# Patient Record
Sex: FEMALE | Race: BLACK OR AFRICAN AMERICAN | NOT HISPANIC OR LATINO | Employment: FULL TIME | URBAN - METROPOLITAN AREA
[De-identification: names, ages, dates, MRNs, and addresses within clinical notes are randomized per-mention and may not be internally consistent; named-entity substitution may affect disease eponyms.]

---

## 2023-08-28 ENCOUNTER — OFFICE VISIT (OUTPATIENT)
Dept: PRIMARY CARE | Facility: CLINIC | Age: 33
End: 2023-08-28
Payer: COMMERCIAL

## 2023-08-28 ENCOUNTER — LAB (OUTPATIENT)
Dept: LAB | Facility: LAB | Age: 33
End: 2023-08-28
Payer: COMMERCIAL

## 2023-08-28 VITALS
SYSTOLIC BLOOD PRESSURE: 138 MMHG | HEIGHT: 59 IN | WEIGHT: 192 LBS | OXYGEN SATURATION: 98 % | DIASTOLIC BLOOD PRESSURE: 90 MMHG | HEART RATE: 69 BPM | BODY MASS INDEX: 38.71 KG/M2

## 2023-08-28 DIAGNOSIS — Z00.00 HEALTHCARE MAINTENANCE: Primary | ICD-10-CM

## 2023-08-28 DIAGNOSIS — R20.2 PARESTHESIAS: ICD-10-CM

## 2023-08-28 DIAGNOSIS — M79.89 LEG SWELLING: ICD-10-CM

## 2023-08-28 DIAGNOSIS — Z00.00 HEALTHCARE MAINTENANCE: ICD-10-CM

## 2023-08-28 PROBLEM — F41.9 ANXIETY: Status: ACTIVE | Noted: 2023-08-28

## 2023-08-28 PROBLEM — N91.5 OLIGOMENORRHEA: Status: RESOLVED | Noted: 2023-08-28 | Resolved: 2023-08-28

## 2023-08-28 PROBLEM — F32.A DEPRESSION: Status: ACTIVE | Noted: 2023-08-28

## 2023-08-28 PROBLEM — J01.90 ACUTE BACTERIAL SINUSITIS: Status: RESOLVED | Noted: 2023-08-28 | Resolved: 2023-08-28

## 2023-08-28 PROBLEM — N64.3 GALACTORRHEA: Status: ACTIVE | Noted: 2023-08-28

## 2023-08-28 PROBLEM — O21.9 NAUSEA AND VOMITING IN PREGNANCY (HHS-HCC): Status: RESOLVED | Noted: 2023-08-28 | Resolved: 2023-08-28

## 2023-08-28 PROBLEM — E78.5 HYPERLIPIDEMIA, ACQUIRED: Status: ACTIVE | Noted: 2023-08-28

## 2023-08-28 PROBLEM — O36.80X0 EXAMINATION TO DETERMINE FETAL VIABILITY OF PREGNANCY (HHS-HCC): Status: RESOLVED | Noted: 2023-08-28 | Resolved: 2023-08-28

## 2023-08-28 PROBLEM — N39.0 ACUTE UTI: Status: RESOLVED | Noted: 2023-08-28 | Resolved: 2023-08-28

## 2023-08-28 PROBLEM — E66.9 OBESITY: Status: RESOLVED | Noted: 2023-08-28 | Resolved: 2023-08-28

## 2023-08-28 PROBLEM — N80.B2: Status: ACTIVE | Noted: 2023-08-28

## 2023-08-28 PROBLEM — R05.9 COUGH: Status: RESOLVED | Noted: 2023-08-28 | Resolved: 2023-08-28

## 2023-08-28 PROBLEM — L30.9 DERMATITIS: Status: RESOLVED | Noted: 2023-08-28 | Resolved: 2023-08-28

## 2023-08-28 PROBLEM — I10 HYPERTENSION: Status: ACTIVE | Noted: 2023-08-28

## 2023-08-28 PROBLEM — J93.9 PNEUMOTHORAX: Status: ACTIVE | Noted: 2023-08-28

## 2023-08-28 PROBLEM — O09.90 HIGH-RISK PREGNANCY (HHS-HCC): Status: ACTIVE | Noted: 2023-08-28

## 2023-08-28 PROBLEM — N76.0 VAGINITIS: Status: RESOLVED | Noted: 2023-08-28 | Resolved: 2023-08-28

## 2023-08-28 PROBLEM — N97.9 FEMALE INFERTILITY: Status: ACTIVE | Noted: 2023-08-28

## 2023-08-28 PROBLEM — O14.90: Status: ACTIVE | Noted: 2023-08-28

## 2023-08-28 PROBLEM — R35.0 URINARY FREQUENCY: Status: RESOLVED | Noted: 2023-08-28 | Resolved: 2023-08-28

## 2023-08-28 PROBLEM — R63.5 WEIGHT GAIN: Status: RESOLVED | Noted: 2023-08-28 | Resolved: 2023-08-28

## 2023-08-28 PROBLEM — F41.1 GENERALIZED ANXIETY DISORDER: Status: ACTIVE | Noted: 2023-08-28

## 2023-08-28 PROBLEM — D35.2 PROLACTINOMA, BENIGN (MULTI): Status: ACTIVE | Noted: 2023-08-28

## 2023-08-28 PROBLEM — B96.89 ACUTE BACTERIAL SINUSITIS: Status: RESOLVED | Noted: 2023-08-28 | Resolved: 2023-08-28

## 2023-08-28 LAB
ALANINE AMINOTRANSFERASE (SGPT) (U/L) IN SER/PLAS: 19 U/L (ref 7–45)
ALBUMIN (G/DL) IN SER/PLAS: 4.1 G/DL (ref 3.4–5)
ALKALINE PHOSPHATASE (U/L) IN SER/PLAS: 38 U/L (ref 33–110)
ANION GAP IN SER/PLAS: 14 MMOL/L (ref 10–20)
ASPARTATE AMINOTRANSFERASE (SGOT) (U/L) IN SER/PLAS: 17 U/L (ref 9–39)
BASOPHILS (10*3/UL) IN BLOOD BY AUTOMATED COUNT: 0.04 X10E9/L (ref 0–0.1)
BASOPHILS/100 LEUKOCYTES IN BLOOD BY AUTOMATED COUNT: 0.7 % (ref 0–2)
BILIRUBIN TOTAL (MG/DL) IN SER/PLAS: 0.5 MG/DL (ref 0–1.2)
CALCIUM (MG/DL) IN SER/PLAS: 8.8 MG/DL (ref 8.6–10.6)
CARBON DIOXIDE, TOTAL (MMOL/L) IN SER/PLAS: 22 MMOL/L (ref 21–32)
CHLORIDE (MMOL/L) IN SER/PLAS: 109 MMOL/L (ref 98–107)
CHOLESTEROL (MG/DL) IN SER/PLAS: 159 MG/DL (ref 0–199)
CHOLESTEROL IN HDL (MG/DL) IN SER/PLAS: 38.9 MG/DL
CHOLESTEROL/HDL RATIO: 4.1
COBALAMIN (VITAMIN B12) (PG/ML) IN SER/PLAS: 254 PG/ML (ref 211–911)
CREATININE (MG/DL) IN SER/PLAS: 0.84 MG/DL (ref 0.5–1.05)
EOSINOPHILS (10*3/UL) IN BLOOD BY AUTOMATED COUNT: 0.1 X10E9/L (ref 0–0.7)
EOSINOPHILS/100 LEUKOCYTES IN BLOOD BY AUTOMATED COUNT: 1.7 % (ref 0–6)
ERYTHROCYTE DISTRIBUTION WIDTH (RATIO) BY AUTOMATED COUNT: 13.2 % (ref 11.5–14.5)
ERYTHROCYTE MEAN CORPUSCULAR HEMOGLOBIN CONCENTRATION (G/DL) BY AUTOMATED: 31.7 G/DL (ref 32–36)
ERYTHROCYTE MEAN CORPUSCULAR VOLUME (FL) BY AUTOMATED COUNT: 93 FL (ref 80–100)
ERYTHROCYTES (10*6/UL) IN BLOOD BY AUTOMATED COUNT: 4.06 X10E12/L (ref 4–5.2)
ESTIMATED AVERAGE GLUCOSE FOR HBA1C: 128 MG/DL
FOLATE (NG/ML) IN SER/PLAS: 8.7 NG/ML
GFR FEMALE: >90 ML/MIN/1.73M2
GLUCOSE (MG/DL) IN SER/PLAS: 76 MG/DL (ref 74–99)
HEMATOCRIT (%) IN BLOOD BY AUTOMATED COUNT: 37.8 % (ref 36–46)
HEMOGLOBIN (G/DL) IN BLOOD: 12 G/DL (ref 12–16)
HEMOGLOBIN A1C/HEMOGLOBIN TOTAL IN BLOOD: 6.1 %
IMMATURE GRANULOCYTES/100 LEUKOCYTES IN BLOOD BY AUTOMATED COUNT: 0 % (ref 0–0.9)
LDL: 111 MG/DL (ref 0–99)
LEUKOCYTES (10*3/UL) IN BLOOD BY AUTOMATED COUNT: 6 X10E9/L (ref 4.4–11.3)
LYMPHOCYTES (10*3/UL) IN BLOOD BY AUTOMATED COUNT: 3.41 X10E9/L (ref 1.2–4.8)
LYMPHOCYTES/100 LEUKOCYTES IN BLOOD BY AUTOMATED COUNT: 57.3 % (ref 13–44)
MONOCYTES (10*3/UL) IN BLOOD BY AUTOMATED COUNT: 0.48 X10E9/L (ref 0.1–1)
MONOCYTES/100 LEUKOCYTES IN BLOOD BY AUTOMATED COUNT: 8.1 % (ref 2–10)
NEUTROPHILS (10*3/UL) IN BLOOD BY AUTOMATED COUNT: 1.92 X10E9/L (ref 1.2–7.7)
NEUTROPHILS/100 LEUKOCYTES IN BLOOD BY AUTOMATED COUNT: 32.2 % (ref 40–80)
NRBC (PER 100 WBCS) BY AUTOMATED COUNT: 0 /100 WBC (ref 0–0)
PLATELETS (10*3/UL) IN BLOOD AUTOMATED COUNT: 329 X10E9/L (ref 150–450)
POTASSIUM (MMOL/L) IN SER/PLAS: 4 MMOL/L (ref 3.5–5.3)
PROTEIN TOTAL: 6.3 G/DL (ref 6.4–8.2)
SODIUM (MMOL/L) IN SER/PLAS: 141 MMOL/L (ref 136–145)
THYROTROPIN (MIU/L) IN SER/PLAS BY DETECTION LIMIT <= 0.05 MIU/L: 1.44 MIU/L (ref 0.44–3.98)
THYROXINE (T4) FREE (NG/DL) IN SER/PLAS: 1.02 NG/DL (ref 0.78–1.48)
TRIGLYCERIDE (MG/DL) IN SER/PLAS: 46 MG/DL (ref 0–149)
UREA NITROGEN (MG/DL) IN SER/PLAS: 11 MG/DL (ref 6–23)
VLDL: 9 MG/DL (ref 0–40)

## 2023-08-28 PROCEDURE — 1036F TOBACCO NON-USER: CPT | Performed by: INTERNAL MEDICINE

## 2023-08-28 PROCEDURE — 36415 COLL VENOUS BLD VENIPUNCTURE: CPT

## 2023-08-28 PROCEDURE — 82607 VITAMIN B-12: CPT

## 2023-08-28 PROCEDURE — 3075F SYST BP GE 130 - 139MM HG: CPT | Performed by: INTERNAL MEDICINE

## 2023-08-28 PROCEDURE — 80053 COMPREHEN METABOLIC PANEL: CPT

## 2023-08-28 PROCEDURE — 82746 ASSAY OF FOLIC ACID SERUM: CPT

## 2023-08-28 PROCEDURE — 3080F DIAST BP >= 90 MM HG: CPT | Performed by: INTERNAL MEDICINE

## 2023-08-28 PROCEDURE — 99214 OFFICE O/P EST MOD 30 MIN: CPT | Performed by: INTERNAL MEDICINE

## 2023-08-28 PROCEDURE — 80061 LIPID PANEL: CPT

## 2023-08-28 PROCEDURE — 84425 ASSAY OF VITAMIN B-1: CPT

## 2023-08-28 PROCEDURE — 84439 ASSAY OF FREE THYROXINE: CPT

## 2023-08-28 PROCEDURE — 84443 ASSAY THYROID STIM HORMONE: CPT

## 2023-08-28 PROCEDURE — 83036 HEMOGLOBIN GLYCOSYLATED A1C: CPT

## 2023-08-28 PROCEDURE — 85025 COMPLETE CBC W/AUTO DIFF WBC: CPT

## 2023-08-28 RX ORDER — METHOCARBAMOL 750 MG/1
TABLET ORAL
COMMUNITY
Start: 2023-04-24

## 2023-08-28 RX ORDER — CLINDAMYCIN PHOSPHATE 10 UG/ML
LOTION TOPICAL
COMMUNITY
Start: 2015-02-25

## 2023-08-28 RX ORDER — CLINDAMYCIN PHOSPHATE 11.9 MG/ML
SOLUTION TOPICAL
COMMUNITY
Start: 2023-05-22

## 2023-08-28 RX ORDER — AMMONIUM LACTATE 12 G/100G
LOTION TOPICAL
COMMUNITY
Start: 2023-05-22

## 2023-08-28 RX ORDER — HYDROCORTISONE 25 MG/G
OINTMENT TOPICAL
COMMUNITY
Start: 2023-06-14

## 2023-08-28 RX ORDER — NORETHINDRONE 5 MG/1
5 TABLET ORAL DAILY
COMMUNITY

## 2023-08-28 RX ORDER — CETIRIZINE HYDROCHLORIDE 10 MG/1
10 TABLET, CHEWABLE ORAL
COMMUNITY
Start: 2012-06-29

## 2023-08-28 NOTE — PROGRESS NOTES
"Subjective   Patient ID: Zayra Sandoval is a 33 y.o. female who presents for Numbness (Legs, feet, arms and hands for about 6 months ).  HPI  Patient describes paresthesias arms and legs for 6 months intermittently grade 7 sometimes gets better or worse with movement changing positions  She does report a remote history of being evaluated for possible MS in the past and at the same time she was found to have a pituitary microadenoma tumor  She does have some associated weakness with this of arms and legs and intermittently  Denies HA vision change dysarthria dysphagia              Health Maintenance:      Colonoscopy:      Mammogram:      Pelvic/Pap:      Low dose chest CT:      Aorta duplex:      Optho:      Podiatry:        Vaccines:      Prevnar 20:      Prevnar 13:      Pneumovax 23:      Tdap:      Shingrix:      COVID:      Influenza:        ROS:      General: denies fever/chills/weight loss      Head: denies HA/trauma/masses/dizziness      Eyes: denies vision change/loss of vision/blurry vision/diplopia/eye pain      Ears: denies hearing loss/tinnitus/otalgia/otorrhea      Nose: denies nasal drainage/anosmia      Throat: denies dysphagia/odynophagia      Lymphatics: denies lymph node swelling      Cardiac: denies CP/palpitations/orthopnea/PND      Pulmonary: denies dyspnea/cough/wheezing      GI: denies abd pain/n/v/diarrhea/melena/hematochezia/hematemesis      : denies dysuria/hematuria/change frequency      Genital: denies genital discharge/lesions      Skin: denies rashes/lesions/masses      MSK: Weakness upper lower extremity ; leg swelling for 3 months intermittently worse when she is on her feet a lot denies weakness/swelling/edema/gait imbalance/pain      Neuro: Paresthesias upper lower extremity denies paresthesias/seizures/dysarthria      Psych: denies depression/anxiety/suicidal or homicidal ideations            Objective   /90   Pulse 69   Ht 1.499 m (4' 11\")   Wt 87.1 kg (192 lb)   " "SpO2 98%   BMI 38.78 kg/m²      Physical Exam:     General: AO3, NAD     Head: atraumatic/NC     Eyes: EOMI/PERRLA. Negative APD     Ears: TM pearly gray, EAC clear. No lesions or erythema     Nose: symmetric nares, no discharge     Throat: trachea midline, uvula midline pink mucosa. No thyromegaly     Lymphatics: no cervical/supraclavicular/ant or posterior cervical adenopathy/axillary/inguinal adenopathy     Breast: not examined     Chest: no deformity or tenderness to palpation     Pulm: CTA b/l, no wheeze/rhonchi/rales. nonlabored     Cardiac: RRR +s1s2, no m/r/g.      GI: soft, NT/ND. Normoactive Bsx4. No rebound/guarding.     Rectal: no examined     MSK: 5/5 strength UE LE. No edema/clubbing/cyanosis     Skin: no rashes/lesions     Vascular: 2+ palp DP PT radials b/l. Negative carotid bruit     Neuro: CNII-XII intact. No focal deficits. Reflexes 2/4 brachioradialis bicep tricep patellar achilles. Finger to nose intact.     Psych: appropriate mood/affect                    No results found for: \"BMPR1A\", \"CBCDIF\"      Assessment/Plan   Diagnoses and all orders for this visit:  Healthcare maintenance  -     CBC and Auto Differential; Future  -     Comprehensive Metabolic Panel; Future  -     Hemoglobin A1C; Future  -     Lipid panel; Future  -     T4, free; Future  -     TSH; Future  -     Vitamin B12; Future  -     Vitamin B1, Whole Blood; Future  -     Folate; Future  Paresthesias  Comments:  Rule out MS pituitary lesion myelopathy hypothyroidism B12 thiamine deficiency versus other  Orders:  -     MR brain w and wo IV contrast; Future  -     MR cervical spine w and wo IV contrast; Future  -     MR thoracic spine w and wo IV contrast; Future  -     MR lumbar spine w and wo IV contrast; Future  -     Referral to Neurology; Future  -     EMG & nerve conduction; Future  Leg swelling  Comments:  Possibly venous stasis disease versus CHF less likely DVT  Orders:  -     Transthoracic echo (TTE) complete; Future  -  "    Vascular US lower extremity venous insufficiency bilateral; Future  -     Vascular US lower extremity venous duplex bilateral; Future    Low-salt diet exercise 30 minutes a day weight loss encouraged    Thank you for making appointment today Zayra    Please follow-up 3 months       Radha De MA

## 2023-08-31 LAB — VITAMIN B1, WHOLE BLOOD: 96 NMOL/L (ref 70–180)

## 2023-09-05 ENCOUNTER — TELEPHONE (OUTPATIENT)
Dept: PRIMARY CARE | Facility: CLINIC | Age: 33
End: 2023-09-05
Payer: COMMERCIAL

## 2023-09-05 NOTE — TELEPHONE ENCOUNTER
Result Communication    Resulted Orders   CBC and Auto Differential   Result Value Ref Range    WBC 6.0 4.4 - 11.3 x10E9/L    nRBC 0.0 0.0 - 0.0 /100 WBC    RBC 4.06 4.00 - 5.20 x10E12/L    Hemoglobin 12.0 12.0 - 16.0 g/dL    Hematocrit 37.8 36.0 - 46.0 %    MCV 93 80 - 100 fL    MCHC 31.7 (L) 32.0 - 36.0 g/dL    Platelets 329 150 - 450 x10E9/L    RDW 13.2 11.5 - 14.5 %    Neutrophils % 32.2 40.0 - 80.0 %    Immature Granulocytes %, Automated 0.0 0.0 - 0.9 %      Comment:       Immature Granulocyte Count (IG) includes promyelocytes,    myelocytes and metamyelocytes but does not include bands.   Percent differential counts (%) should be interpreted in the   context of the absolute cell counts (cells/L).    Lymphocytes % 57.3 13.0 - 44.0 %    Monocytes % 8.1 2.0 - 10.0 %    Eosinophils % 1.7 0.0 - 6.0 %    Basophils % 0.7 0.0 - 2.0 %    Neutrophils Absolute 1.92 1.20 - 7.70 x10E9/L    Lymphocytes Absolute 3.41 1.20 - 4.80 x10E9/L    Monocytes Absolute 0.48 0.10 - 1.00 x10E9/L    Eosinophils Absolute 0.10 0.00 - 0.70 x10E9/L    Basophils Absolute 0.04 0.00 - 0.10 x10E9/L   Comprehensive Metabolic Panel   Result Value Ref Range    Glucose 76 74 - 99 mg/dL    Sodium 141 136 - 145 mmol/L    Potassium 4.0 3.5 - 5.3 mmol/L    Chloride 109 (H) 98 - 107 mmol/L    Bicarbonate 22 21 - 32 mmol/L    Anion Gap 14 10 - 20 mmol/L    Urea Nitrogen 11 6 - 23 mg/dL    Creatinine 0.84 0.50 - 1.05 mg/dL    GFR Female >90 >90 mL/min/1.73m2      Comment:       CALCULATIONS OF ESTIMATED GFR ARE PERFORMED   USING THE 2021 CKD-EPI STUDY REFIT EQUATION   WITHOUT THE RACE VARIABLE FOR THE IDMS-TRACEABLE   CREATININE METHODS.    https://jasn.asnjournals.org/content/early/2021/09/22/ASN.1368252500    Calcium 8.8 8.6 - 10.6 mg/dL    Albumin 4.1 3.4 - 5.0 g/dL    Alkaline Phosphatase 38 33 - 110 U/L    Total Protein 6.3 (L) 6.4 - 8.2 g/dL    AST 17 9 - 39 U/L    Total Bilirubin 0.5 0.0 - 1.2 mg/dL    ALT (SGPT) 19 7 - 45 U/L      Comment:        Patients treated with Sulfasalazine may generate    falsely decreased results for ALT.   Hemoglobin A1C   Result Value Ref Range    Hemoglobin A1C 6.1 (A) %      Comment:           Diagnosis of Diabetes-Adults   Non-Diabetic: < or = 5.6%   Increased risk for developing diabetes: 5.7-6.4%   Diagnostic of diabetes: > or = 6.5%  .       Monitoring of Diabetes                Age (y)     Therapeutic Goal (%)   Adults:          >18           <7.0   Pediatrics:    13-18           <7.5                   7-12           <8.0                   0- 6            7.5-8.5   American Diabetes Association. Diabetes Care 33(S1), Jan 2010.    Estimated Average Glucose 128 MG/DL   Lipid panel   Result Value Ref Range    Cholesterol 159 0 - 199 mg/dL      Comment:      .      AGE      DESIRABLE   BORDERLINE HIGH   HIGH     0-19 Y     0 - 169       170 - 199     >/= 200    20-24 Y     0 - 189       190 - 224     >/= 225         >24 Y     0 - 199       200 - 239     >/= 240   **All ranges are based on fasting samples. Specific   therapeutic targets will vary based on patient-specific   cardiac risk.  .   Pediatric guidelines reference:Pediatrics 2011, 128(S5).   Adult guidelines reference: NCEP ATPIII Guidelines,     ERICH 2001, 258:2486-97  .   Venipuncture immediately after or during the    administration of Metamizole may lead to falsely   low results. Testing should be performed immediately   prior to Metamizole dosing.    HDL 38.9 (A) mg/dL      Comment:      .      AGE      VERY LOW   LOW     NORMAL    HIGH       0-19 Y       < 35   < 40     40-45     ----    20-24 Y       ----   < 40       >45     ----      >24 Y       ----   < 40     40-60      >60  .    Cholesterol/HDL Ratio 4.1       Comment:      REF VALUES  DESIRABLE  < 3.4  HIGH RISK  > 5.0     (H) 0 - 99 mg/dL      Comment:      .                           NEAR      BORD      AGE      DESIRABLE  OPTIMAL    HIGH     HIGH     VERY HIGH     0-19 Y     0 - 109     ---     110-129   >/= 130     ----    20-24 Y     0 - 119     ---    120-159   >/= 160     ----      >24 Y     0 -  99   100-129  130-159   160-189     >/=190  .    VLDL 9 0 - 40 mg/dL    Triglycerides 46 0 - 149 mg/dL      Comment:      .      AGE      DESIRABLE   BORDERLINE HIGH   HIGH     VERY HIGH   0 D-90 D    19 - 174         ----         ----        ----  91 D- 9 Y     0 -  74        75 -  99     >/= 100      ----    10-19 Y     0 -  89        90 - 129     >/= 130      ----    20-24 Y     0 - 114       115 - 149     >/= 150      ----         >24 Y     0 - 149       150 - 199    200- 499    >/= 500  .   Venipuncture immediately after or during the    administration of Metamizole may lead to falsely   low results. Testing should be performed immediately   prior to Metamizole dosing.   T4, free   Result Value Ref Range    Free T4 1.02 0.78 - 1.48 ng/dL      Comment:       Thyroxine Free testing is performed using different testing    methodology at [facility] than at other    [facility]. Direct result comparisons should    only be made within the same method.   TSH   Result Value Ref Range    TSH 1.44 0.44 - 3.98 mIU/L      Comment:       TSH testing is performed using different testing    methodology at [facility] than at other    [facility]. Direct result comparisons should    only be made within the same method.   Vitamin B12   Result Value Ref Range    Vitamin B-12 254 211 - 911 pg/mL   Vitamin B1, Whole Blood   Result Value Ref Range    Vitamin B1, Whole Blood 96 70 - 180 nmol/L      Comment:      INTERPRETIVE INFORMATION: Vitamin B1, Whole Blood  This assay measures the concentration of thiamine diphosphate   (TDP), the primary active form of vitamin B1. Approximately 90   percent of vitamin B1 present in whole blood is TDP. Thiamine and   thiamine monophosphate, which comprise the remaining 10 percent,   are not measured.  This test was developed and its performance  characteristics   determined by [facility]. It has not been cleared or   approved by the US Food and Drug Administration. This test was   performed in a CLIA certified laboratory and is intended for   clinical purposes.  Performed By: [facility]  [address] [place] [id number]  : Mirza Morris MD, PhD  CLIA Number: [id number]   Folate   Result Value Ref Range    Folate 8.7 >5.0 ng/mL      Comment:      Low           <3.4  Borderline 3.4-5.0  Normal        >5.0  .   Biotin interference may cause falsely elevated results.    Patients taking a Biotin dose of up to 5 mg/day should    refrain from taking Biotin for 24 hours before sample    collection. Providers may contact their local laboratory   for further information.       5:39 PM      Called patient no answer left voicemail advised increased risk of diabetes with an A1c of 6.1% should work on low sugar diet increase exercise 30 minutes a day otherwise currently available blood work is stable normal if any further questions or would like an in office result review please schedule follow-up in the next 2 to 4 weeks thank you

## 2023-09-07 ENCOUNTER — DOCUMENTATION (OUTPATIENT)
Dept: PRIMARY CARE | Facility: CLINIC | Age: 33
End: 2023-09-07
Payer: COMMERCIAL

## 2023-09-07 NOTE — PROGRESS NOTES
Received notification today of insurance refusal of MRI lumbar spine,  The patient has worsening leg weakness and reported possible history of MS in the past she needs evaluated for MS as well as myelopathy given leg weakness in my medical opinion should complete MRI lumbar spine as ordered  Paper denial received and requested my front office Yaneth to schedule peer to peer for September 8, 2023 at 4:15 PM criteria being met are #3 possible spinal cord nerve problem on the list and #8 evaluation for MS on the printed copy  Thank you

## 2023-09-08 ENCOUNTER — DOCUMENTATION (OUTPATIENT)
Dept: PRIMARY CARE | Facility: CLINIC | Age: 33
End: 2023-09-08
Payer: COMMERCIAL

## 2023-09-08 NOTE — PROGRESS NOTES
Spoke with insurance Dr Raza neuro--  Advised that my concern for possible myelopathy given progressive paresthesias for 6 months and reported history of MS diagnosis in the past  He advised insurance only covers MRI brain cervical at this time--insurance will not cover MRI thoracic lumbar at this time and last MRI brain cervical are normal  Advised my concern of avoiding delay in diagnosis for possible cord lesion could theoretically have 2 contaminant processes especially since symptoms are bilateral there is a brain lesion reported which likely to have lateralizing symptoms so there is concern of why both sides are involved hence would recommend entire image of MRI brain MRI cervical thoracic lumbar as ordered could have proximal distal MS lesions vs other process

## 2023-09-18 ENCOUNTER — TELEPHONE (OUTPATIENT)
Dept: PRIMARY CARE | Facility: CLINIC | Age: 33
End: 2023-09-18
Payer: COMMERCIAL

## 2023-09-29 LAB
CHLAMYDIA TRACH., AMPLIFIED: NEGATIVE
N. GONORRHEA, AMPLIFIED: NEGATIVE
TRICHOMONAS VAGINALIS: NEGATIVE

## 2023-11-28 ENCOUNTER — APPOINTMENT (OUTPATIENT)
Dept: ENDOCRINOLOGY | Facility: CLINIC | Age: 33
End: 2023-11-28
Payer: COMMERCIAL

## 2024-02-19 ENCOUNTER — OFFICE VISIT (OUTPATIENT)
Dept: PRIMARY CARE | Facility: CLINIC | Age: 34
End: 2024-02-19
Payer: COMMERCIAL

## 2024-02-19 VITALS
DIASTOLIC BLOOD PRESSURE: 92 MMHG | HEIGHT: 59 IN | WEIGHT: 181 LBS | SYSTOLIC BLOOD PRESSURE: 142 MMHG | BODY MASS INDEX: 36.49 KG/M2 | HEART RATE: 82 BPM | OXYGEN SATURATION: 98 %

## 2024-02-19 DIAGNOSIS — Z23 NEED FOR INFLUENZA VACCINATION: ICD-10-CM

## 2024-02-19 DIAGNOSIS — I10 PRIMARY HYPERTENSION: Primary | ICD-10-CM

## 2024-02-19 DIAGNOSIS — D35.2 PROLACTINOMA, BENIGN (MULTI): ICD-10-CM

## 2024-02-19 DIAGNOSIS — R60.0 LEG EDEMA: ICD-10-CM

## 2024-02-19 PROCEDURE — 3080F DIAST BP >= 90 MM HG: CPT | Performed by: INTERNAL MEDICINE

## 2024-02-19 PROCEDURE — 3077F SYST BP >= 140 MM HG: CPT | Performed by: INTERNAL MEDICINE

## 2024-02-19 PROCEDURE — 90471 IMMUNIZATION ADMIN: CPT | Performed by: INTERNAL MEDICINE

## 2024-02-19 PROCEDURE — 1036F TOBACCO NON-USER: CPT | Performed by: INTERNAL MEDICINE

## 2024-02-19 PROCEDURE — 99215 OFFICE O/P EST HI 40 MIN: CPT | Performed by: INTERNAL MEDICINE

## 2024-02-19 PROCEDURE — 90686 IIV4 VACC NO PRSV 0.5 ML IM: CPT | Performed by: INTERNAL MEDICINE

## 2024-02-19 RX ORDER — HYDROCHLOROTHIAZIDE 25 MG/1
12.5 TABLET ORAL DAILY
Qty: 15 TABLET | Refills: 2 | Status: SHIPPED | OUTPATIENT
Start: 2024-02-19 | End: 2024-03-14

## 2024-02-19 NOTE — PROGRESS NOTES
Subjective   Patient ID: Zayra Sandoval is a 34 y.o. female who presents for Follow-up, health screen form, and Flu Vaccine.  HPI        female past medical history endometriosis catamenial pneumothorax pituitary microadenoma prolactinoma   recent ER visit 5/9/10 allergic dermatitis given prednisone pepcid  ER presentation December 2020 right-sided chest pain dyspnea pneumothorax  ER presentation June 2021 preeclampsia pneumothorax status post right VATS wedge resection    Patient presents for follow-up states she is having intermittent headaches rather generalized over the last several months grade 5 out of 10 presently grade 0 out of 10 seems worse her blood pressure is uncontrolled she feels like it is running high at home greater than 140/90 seems better and her blood pressure is under better control  Sometimes feels dizzy when he gets this way none at present  Denies vision changes diplopia nausea vomiting fever chills focal weakness paresthesias        Health Maintenance:      Colonoscopy:      Mammogram:      Pelvic/Pap:      Low dose chest CT:      Aorta duplex:      Optho:      Podiatry:        Vaccines:      Refer to Epic Vaccination Log        ROS:      General: denies fever/chills/weight loss      Head: Chronic headaches intermittent dizziness better at present denies HA/trauma/masses/dizziness      Eyes: denies vision change/loss of vision/blurry vision/diplopia/eye pain      Ears: denies hearing loss/tinnitus/otalgia/otorrhea      Nose: denies nasal drainage/anosmia      Throat: denies dysphagia/odynophagia      Lymphatics: denies lymph node swelling      Breast: denies masses/discharge/dimpling/skin changes      Cardiac: denies CP/palpitations/orthopnea/PND      Pulmonary: denies dyspnea/cough/wheezing      GI: denies abd pain/n/v/diarrhea/melena/hematochezia/hematemesis      : denies dysuria/hematuria/change frequency      Genital: denies genital discharge/lesions      Skin: denies  "rashes/lesions/masses      MSK: Chronic occasional leg swelling at least over the last 3 months worse and 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 Negative APD     Ears: TM pearly gray, EAC clear. No lesions or erythema     Nose: symmetric nares, no discharge     Throat: trachea midline, uvula midline pink mucosa. No thyromegaly     Lymphatics: no cervical/supraclavicular/ant or posterior cervical adenopathy/axillary/inguinal adenopathy     Breast: not examined     Chest: no deformity or tenderness to palpation     Pulm: CTA b/l, no wheeze/rhonchi/rales. nonlabored     Cardiac: RRR +s1s2, no m/r/g.      GI: soft, NT/ND. Normoactive Bsx4. No rebound/guarding.     Rectal: not examined     Genital: not examined     MSK: 5/5 strength UE LE. No edema/clubbing/cyanosis     Skin: no rashes/lesions     Vascular: 2+ palp DP PT radials b/l. Negative carotid bruit     Neuro: CNII-XII intact. No focal deficits. Reflexes 2/4 brachioradialis bicep tricep patellar achilles. Finger to nose intact.     Psych: appropriate mood/affect                    No results found for: \"BMPR1A\", \"CBCDIF\"      Assessment/Plan   Diagnoses and all orders for this visit:  Primary hypertension  Comments:  Likely causing secondary headaches with intermittent urgency none at present  Orders:  -     hydroCHLOROthiazide (HYDRODiuril) 25 mg tablet; Take 0.5 tablets (12.5 mg) by mouth once daily.  -     Transthoracic Echo Complete; Future  Need for influenza vaccination  -     Flu vaccine (IIV4) age 6 months and greater, preservative free  Leg " edema  Comments:  CHF versus venous stasis versus other  Orders:  -     Transthoracic Echo Complete; Future  -     Vascular US lower extremity venous insufficiency bilateral; Future  Prolactinoma, benign (CMS/HCC)    Goal blood pressure less than 140/90  Go to the ER for any severe headaches that do not go away or dizziness or worsening symptoms    Please call and follow-up with neurology    Work form completed for school and given back to the patient after signing    30 pound weight loss recommended increase exercise    Screening blood work due September 2024    Thank you for making appointment Zayra    Please follow-up 3 months    Dimitri Pickard DO, KENN De MA

## 2024-02-29 ENCOUNTER — APPOINTMENT (OUTPATIENT)
Dept: VASCULAR MEDICINE | Facility: HOSPITAL | Age: 34
End: 2024-02-29
Payer: COMMERCIAL

## 2024-03-12 ENCOUNTER — HOSPITAL ENCOUNTER (OUTPATIENT)
Dept: CARDIOLOGY | Facility: HOSPITAL | Age: 34
Discharge: HOME | End: 2024-03-12
Payer: COMMERCIAL

## 2024-03-12 DIAGNOSIS — R60.0 LEG EDEMA: ICD-10-CM

## 2024-03-12 DIAGNOSIS — I10 PRIMARY HYPERTENSION: ICD-10-CM

## 2024-03-12 PROCEDURE — 93306 TTE W/DOPPLER COMPLETE: CPT | Performed by: INTERNAL MEDICINE

## 2024-03-12 PROCEDURE — 93306 TTE W/DOPPLER COMPLETE: CPT

## 2024-03-13 LAB
AORTIC VALVE PEAK VELOCITY: 1.65 M/S
AV PEAK GRADIENT: 10.9 MMHG
AVA (PEAK VEL): 3.19 CM2
EJECTION FRACTION APICAL 4 CHAMBER: 48.9
EJECTION FRACTION: 52 %
LEFT ATRIUM VOLUME AREA LENGTH INDEX BSA: 22.3 ML/M2
LEFT VENTRICLE INTERNAL DIMENSION DIASTOLE: 4.4 CM (ref 3.5–6)
LEFT VENTRICULAR OUTFLOW TRACT DIAMETER: 2.1 CM
MITRAL VALVE E/A RATIO: 1
MITRAL VALVE E/E' RATIO: 11.34
RIGHT VENTRICLE FREE WALL PEAK S': 13.5 CM/S
RIGHT VENTRICLE PEAK SYSTOLIC PRESSURE: 20.6 MMHG
TRICUSPID ANNULAR PLANE SYSTOLIC EXCURSION: 2 CM

## 2024-03-14 DIAGNOSIS — I10 PRIMARY HYPERTENSION: ICD-10-CM

## 2024-03-14 RX ORDER — HYDROCHLOROTHIAZIDE 25 MG/1
TABLET ORAL
Qty: 45 TABLET | Refills: 0 | Status: SHIPPED | OUTPATIENT
Start: 2024-03-14

## 2024-04-25 ENCOUNTER — HOSPITAL ENCOUNTER (OUTPATIENT)
Dept: VASCULAR MEDICINE | Facility: HOSPITAL | Age: 34
Discharge: HOME | End: 2024-04-25
Payer: COMMERCIAL

## 2024-04-25 DIAGNOSIS — R60.0 LEG EDEMA: ICD-10-CM

## 2024-04-25 PROCEDURE — 93970 EXTREMITY STUDY: CPT

## 2024-04-25 PROCEDURE — 93970 EXTREMITY STUDY: CPT | Performed by: SURGERY

## 2024-05-20 ENCOUNTER — APPOINTMENT (OUTPATIENT)
Dept: PRIMARY CARE | Facility: CLINIC | Age: 34
End: 2024-05-20

## 2024-05-20 PROCEDURE — NOSHO NO SHOW VISIT: Performed by: INTERNAL MEDICINE

## 2024-06-19 ENCOUNTER — APPOINTMENT (OUTPATIENT)
Dept: PRIMARY CARE | Facility: CLINIC | Age: 34
End: 2024-06-19
Payer: COMMERCIAL

## 2024-06-21 ENCOUNTER — TELEPHONE (OUTPATIENT)
Dept: OBSTETRICS AND GYNECOLOGY | Facility: CLINIC | Age: 34
End: 2024-06-21
Payer: COMMERCIAL

## 2024-06-24 DIAGNOSIS — N80.9 ENDOMETRIOSIS: Primary | ICD-10-CM

## 2024-06-24 RX ORDER — NORETHINDRONE 5 MG/1
5 TABLET ORAL DAILY
Qty: 90 TABLET | Refills: 3 | Status: SHIPPED | OUTPATIENT
Start: 2024-06-24

## 2024-07-01 NOTE — PROGRESS NOTES
Division of Minimally Invasive Gynecologic Surgery  [facility]    24 Gynecology Visit    CC: Endometriosis     Zayra Sandoval is a 34 y.o. P3 ( x3) who presents in follow up for path-proven endometriosis w/ catamenial pneumothorax (prior pt of Dr. Aponte). Currently on norethindrone, amenorrheic. Last visit 2023.     New pain with intercourse - vulvovaginal irritation, possible tearing of tissue, burning/chafing pain. Same partner.     Single episode of breakthrough bleeding on norethindrone. Resolved with short course of increasing NE to 10mg daily, now back down to 5mg and no further bleeding    Last pap: 2023 negative/negative   PMHx: benign prolactinoma, HTN, endometriosis w/ catamenial pneumothorax     PMHx, PSHx, SHx, Allergies, and Medications updated in Epic.    ROS: reviewed and negative    PE: LMP 2024 (Exact Date)   Breastfeeding No    Constitutional:  No acute distress, well-nourished and well-developed  HEENT: EOM grossly intact, MMM, neck supple and with full ROM  Pulm:  Effort normal. No accessory muscle usage.  No respiratory distress.  Neurological:  She is alert and oriented to person place and time.  Skin: Warm, no pallor.  Psychiatric:  She has normal mood and affect.    A/P: Zayra Sandoval is a 34 y.o. P3 ( x3) who presents in follow up for path-proven endometriosis w/ catamenial pneumothorax (prior pt of Dr. Aponte). Currently on norethindrone, amenorrheic. Last visit 2023.  - Continue norethindrone. If further breakthrough bleeding, we can consider switch to Myfembree. Would plan to continue norethindrone during transition to prevent breakthrough bleeding.  - Suspect pain w/ intercourse is 2/2 to vulvovaginal atrophic changes from chronic progesterone suppression. Recommend use of vaginal estrogen, she is open to this. GoodRx coupon sent.   - RTC in one year, earlier SUZI Perales MD  Division of Minimally Invasive  Gynecologic Surgery  [facility], [facility]

## 2024-07-04 DIAGNOSIS — I10 PRIMARY HYPERTENSION: ICD-10-CM

## 2024-07-05 ENCOUNTER — APPOINTMENT (OUTPATIENT)
Dept: OBSTETRICS AND GYNECOLOGY | Facility: CLINIC | Age: 34
End: 2024-07-05
Payer: COMMERCIAL

## 2024-07-05 DIAGNOSIS — Z11.3 SCREENING FOR STD (SEXUALLY TRANSMITTED DISEASE): ICD-10-CM

## 2024-07-05 DIAGNOSIS — N80.9 ENDOMETRIOSIS: Primary | ICD-10-CM

## 2024-07-05 DIAGNOSIS — N95.2 VAGINAL ATROPHY: ICD-10-CM

## 2024-07-05 DIAGNOSIS — J93.83 CATAMENIAL PNEUMOTHORAX: ICD-10-CM

## 2024-07-05 PROCEDURE — 99214 OFFICE O/P EST MOD 30 MIN: CPT | Performed by: STUDENT IN AN ORGANIZED HEALTH CARE EDUCATION/TRAINING PROGRAM

## 2024-07-05 PROCEDURE — 87661 TRICHOMONAS VAGINALIS AMPLIF: CPT

## 2024-07-05 PROCEDURE — 87491 CHLMYD TRACH DNA AMP PROBE: CPT

## 2024-07-05 PROCEDURE — 87591 N.GONORRHOEAE DNA AMP PROB: CPT

## 2024-07-05 RX ORDER — ESTRADIOL 0.1 MG/G
CREAM VAGINAL
Qty: 42.5 G | Refills: 3 | Status: SHIPPED | OUTPATIENT
Start: 2024-07-05

## 2024-07-06 LAB
C TRACH RRNA SPEC QL NAA+PROBE: NEGATIVE
N GONORRHOEA DNA SPEC QL PROBE+SIG AMP: NEGATIVE
T VAGINALIS RRNA SPEC QL NAA+PROBE: NEGATIVE

## 2024-07-09 RX ORDER — HYDROCHLOROTHIAZIDE 25 MG/1
TABLET ORAL DAILY
Qty: 45 TABLET | Refills: 0 | Status: SHIPPED | OUTPATIENT
Start: 2024-07-09

## 2024-10-07 ENCOUNTER — HOSPITAL ENCOUNTER (EMERGENCY)
Facility: HOSPITAL | Age: 34
Discharge: HOME | End: 2024-10-07
Payer: COMMERCIAL

## 2024-10-07 ENCOUNTER — HOSPITAL ENCOUNTER (OUTPATIENT)
Dept: CARDIOLOGY | Facility: HOSPITAL | Age: 34
Discharge: HOME | End: 2024-10-07
Payer: COMMERCIAL

## 2024-10-07 ENCOUNTER — APPOINTMENT (OUTPATIENT)
Dept: RADIOLOGY | Facility: HOSPITAL | Age: 34
End: 2024-10-07
Payer: COMMERCIAL

## 2024-10-07 VITALS
HEART RATE: 78 BPM | DIASTOLIC BLOOD PRESSURE: 68 MMHG | BODY MASS INDEX: 34.95 KG/M2 | WEIGHT: 178 LBS | RESPIRATION RATE: 16 BRPM | OXYGEN SATURATION: 99 % | SYSTOLIC BLOOD PRESSURE: 149 MMHG | TEMPERATURE: 97.1 F | HEIGHT: 60 IN

## 2024-10-07 DIAGNOSIS — I16.0 HYPERTENSIVE URGENCY: Primary | ICD-10-CM

## 2024-10-07 DIAGNOSIS — R51.9 ACUTE NONINTRACTABLE HEADACHE, UNSPECIFIED HEADACHE TYPE: ICD-10-CM

## 2024-10-07 LAB
ALBUMIN SERPL BCP-MCNC: 4.3 G/DL (ref 3.4–5)
ALP SERPL-CCNC: 43 U/L (ref 33–110)
ALT SERPL W P-5'-P-CCNC: 26 U/L (ref 7–45)
ANION GAP SERPL CALC-SCNC: 12 MMOL/L (ref 10–20)
APPEARANCE UR: CLEAR
AST SERPL W P-5'-P-CCNC: 18 U/L (ref 9–39)
ATRIAL RATE: 86 BPM
BASOPHILS # BLD AUTO: 0.03 X10*3/UL (ref 0–0.1)
BASOPHILS NFR BLD AUTO: 0.5 %
BILIRUB SERPL-MCNC: 0.4 MG/DL (ref 0–1.2)
BILIRUB UR STRIP.AUTO-MCNC: NEGATIVE MG/DL
BUN SERPL-MCNC: 11 MG/DL (ref 6–23)
CALCIUM SERPL-MCNC: 8.7 MG/DL (ref 8.6–10.3)
CARDIAC TROPONIN I PNL SERPL HS: 3 NG/L (ref 0–13)
CARDIAC TROPONIN I PNL SERPL HS: 3 NG/L (ref 0–13)
CHLORIDE SERPL-SCNC: 105 MMOL/L (ref 98–107)
CO2 SERPL-SCNC: 25 MMOL/L (ref 21–32)
COLOR UR: NORMAL
CREAT SERPL-MCNC: 0.83 MG/DL (ref 0.5–1.05)
EGFRCR SERPLBLD CKD-EPI 2021: >90 ML/MIN/1.73M*2
EOSINOPHIL # BLD AUTO: 0.13 X10*3/UL (ref 0–0.7)
EOSINOPHIL NFR BLD AUTO: 2.2 %
ERYTHROCYTE [DISTWIDTH] IN BLOOD BY AUTOMATED COUNT: 13.1 % (ref 11.5–14.5)
GLUCOSE SERPL-MCNC: 135 MG/DL (ref 74–99)
GLUCOSE UR STRIP.AUTO-MCNC: NORMAL MG/DL
HCT VFR BLD AUTO: 40.8 % (ref 36–46)
HGB BLD-MCNC: 13.5 G/DL (ref 12–16)
HOLD SPECIMEN: NORMAL
IMM GRANULOCYTES # BLD AUTO: 0.01 X10*3/UL (ref 0–0.7)
IMM GRANULOCYTES NFR BLD AUTO: 0.2 % (ref 0–0.9)
KETONES UR STRIP.AUTO-MCNC: NEGATIVE MG/DL
LEUKOCYTE ESTERASE UR QL STRIP.AUTO: NEGATIVE
LYMPHOCYTES # BLD AUTO: 2.2 X10*3/UL (ref 1.2–4.8)
LYMPHOCYTES NFR BLD AUTO: 37.4 %
MCH RBC QN AUTO: 30.4 PG (ref 26–34)
MCHC RBC AUTO-ENTMCNC: 33.1 G/DL (ref 32–36)
MCV RBC AUTO: 92 FL (ref 80–100)
MONOCYTES # BLD AUTO: 0.47 X10*3/UL (ref 0.1–1)
MONOCYTES NFR BLD AUTO: 8 %
NEUTROPHILS # BLD AUTO: 3.04 X10*3/UL (ref 1.2–7.7)
NEUTROPHILS NFR BLD AUTO: 51.7 %
NITRITE UR QL STRIP.AUTO: NEGATIVE
NRBC BLD-RTO: 0 /100 WBCS (ref 0–0)
P AXIS: 36 DEGREES
P OFFSET: 203 MS
P ONSET: 147 MS
PH UR STRIP.AUTO: 6.5 [PH]
PLATELET # BLD AUTO: 333 X10*3/UL (ref 150–450)
POTASSIUM SERPL-SCNC: 3.8 MMOL/L (ref 3.5–5.3)
PR INTERVAL: 154 MS
PROT SERPL-MCNC: 6.4 G/DL (ref 6.4–8.2)
PROT UR STRIP.AUTO-MCNC: NEGATIVE MG/DL
Q ONSET: 224 MS
QRS COUNT: 15 BEATS
QRS DURATION: 80 MS
QT INTERVAL: 354 MS
QTC CALCULATION(BAZETT): 423 MS
QTC FREDERICIA: 399 MS
R AXIS: 72 DEGREES
RBC # BLD AUTO: 4.44 X10*6/UL (ref 4–5.2)
RBC # UR STRIP.AUTO: NEGATIVE /UL
SODIUM SERPL-SCNC: 138 MMOL/L (ref 136–145)
SP GR UR STRIP.AUTO: 1.02
T AXIS: 15 DEGREES
T OFFSET: 401 MS
UROBILINOGEN UR STRIP.AUTO-MCNC: NORMAL MG/DL
VENTRICULAR RATE: 86 BPM
WBC # BLD AUTO: 5.9 X10*3/UL (ref 4.4–11.3)

## 2024-10-07 PROCEDURE — 84484 ASSAY OF TROPONIN QUANT: CPT | Performed by: PHYSICIAN ASSISTANT

## 2024-10-07 PROCEDURE — 81003 URINALYSIS AUTO W/O SCOPE: CPT | Performed by: PHYSICIAN ASSISTANT

## 2024-10-07 PROCEDURE — 96375 TX/PRO/DX INJ NEW DRUG ADDON: CPT

## 2024-10-07 PROCEDURE — 85025 COMPLETE CBC W/AUTO DIFF WBC: CPT | Performed by: PHYSICIAN ASSISTANT

## 2024-10-07 PROCEDURE — 2500000004 HC RX 250 GENERAL PHARMACY W/ HCPCS (ALT 636 FOR OP/ED): Performed by: PHYSICIAN ASSISTANT

## 2024-10-07 PROCEDURE — 36415 COLL VENOUS BLD VENIPUNCTURE: CPT | Performed by: PHYSICIAN ASSISTANT

## 2024-10-07 PROCEDURE — 99285 EMERGENCY DEPT VISIT HI MDM: CPT | Mod: 25

## 2024-10-07 PROCEDURE — 93005 ELECTROCARDIOGRAM TRACING: CPT

## 2024-10-07 PROCEDURE — 84075 ASSAY ALKALINE PHOSPHATASE: CPT | Performed by: PHYSICIAN ASSISTANT

## 2024-10-07 PROCEDURE — 70450 CT HEAD/BRAIN W/O DYE: CPT | Performed by: RADIOLOGY

## 2024-10-07 PROCEDURE — 70450 CT HEAD/BRAIN W/O DYE: CPT

## 2024-10-07 PROCEDURE — 96374 THER/PROPH/DIAG INJ IV PUSH: CPT

## 2024-10-07 RX ORDER — AMLODIPINE BESYLATE 10 MG/1
10 TABLET ORAL DAILY
Qty: 30 TABLET | Refills: 0 | Status: SHIPPED | OUTPATIENT
Start: 2024-10-07 | End: 2024-11-06

## 2024-10-07 RX ORDER — MORPHINE SULFATE 4 MG/ML
4 INJECTION, SOLUTION INTRAMUSCULAR; INTRAVENOUS ONCE
Status: COMPLETED | OUTPATIENT
Start: 2024-10-07 | End: 2024-10-07

## 2024-10-07 RX ORDER — KETOROLAC TROMETHAMINE 30 MG/ML
15 INJECTION, SOLUTION INTRAMUSCULAR; INTRAVENOUS ONCE
Status: COMPLETED | OUTPATIENT
Start: 2024-10-07 | End: 2024-10-07

## 2024-10-07 RX ORDER — HYDRALAZINE HYDROCHLORIDE 20 MG/ML
5 INJECTION INTRAMUSCULAR; INTRAVENOUS ONCE
Status: COMPLETED | OUTPATIENT
Start: 2024-10-07 | End: 2024-10-07

## 2024-10-07 RX ORDER — PROCHLORPERAZINE MALEATE 10 MG
10 TABLET ORAL EVERY 8 HOURS PRN
Qty: 9 TABLET | Refills: 0 | Status: SHIPPED | OUTPATIENT
Start: 2024-10-07

## 2024-10-07 RX ORDER — ONDANSETRON HYDROCHLORIDE 2 MG/ML
4 INJECTION, SOLUTION INTRAVENOUS ONCE
Status: COMPLETED | OUTPATIENT
Start: 2024-10-07 | End: 2024-10-07

## 2024-10-07 RX ORDER — PROCHLORPERAZINE EDISYLATE 5 MG/ML
5 INJECTION INTRAMUSCULAR; INTRAVENOUS ONCE
Status: COMPLETED | OUTPATIENT
Start: 2024-10-07 | End: 2024-10-07

## 2024-10-07 ASSESSMENT — PAIN - FUNCTIONAL ASSESSMENT
PAIN_FUNCTIONAL_ASSESSMENT: 0-10

## 2024-10-07 ASSESSMENT — PAIN SCALES - GENERAL
PAINLEVEL_OUTOF10: 3
PAINLEVEL_OUTOF10: 2
PAINLEVEL_OUTOF10: 9
PAINLEVEL_OUTOF10: 9

## 2024-10-07 ASSESSMENT — COLUMBIA-SUICIDE SEVERITY RATING SCALE - C-SSRS
6. HAVE YOU EVER DONE ANYTHING, STARTED TO DO ANYTHING, OR PREPARED TO DO ANYTHING TO END YOUR LIFE?: NO
1. IN THE PAST MONTH, HAVE YOU WISHED YOU WERE DEAD OR WISHED YOU COULD GO TO SLEEP AND NOT WAKE UP?: NO
2. HAVE YOU ACTUALLY HAD ANY THOUGHTS OF KILLING YOURSELF?: NO

## 2024-10-07 ASSESSMENT — PAIN DESCRIPTION - LOCATION: LOCATION: HEAD

## 2024-10-07 NOTE — Clinical Note
Zayra Sandoval was seen and treated in our emergency department on 10/7/2024.  She may return to work on 10/09/2024.       If you have any questions or concerns, please don't hesitate to call.      Kailee Rayo PA-C

## 2024-10-07 NOTE — ED NOTES
"Received patient at this time for evaluation of elevated BP, patient states non-compliance with BP meds for many months due to \"not going back to PCP.\" Has been experiencing chest pain, headache, some nausea x days. Alert & oriented x 4. Pending MD marilin phillips and plan of care.      Leyla Hancock RN  10/07/24 0946    "

## 2024-10-07 NOTE — Clinical Note
Zayra Sandoval was seen and treated in our emergency department on 10/7/2024.  She may return to school on 10/09/2024.      If you have any questions or concerns, please don't hesitate to call.      Kailee Rayo PA-C

## 2024-10-07 NOTE — DISCHARGE INSTRUCTIONS
Begin amlodipine.  Take this medication daily.  Continue to check blood pressure daily.  Write the value down and bring log with you to your follow-up appointment.  If it remains persistently elevated while on amlodipine they may want to add hydrochlorothiazide again.  Please continue Tylenol and/or ibuprofen as needed for headaches.  May take Compazine every 8 hours as needed for headaches and nausea.  Follow-up with primary care provider in 2 to 5 days.  Return to ER for any new or worsening symptoms.

## 2024-10-07 NOTE — ED TRIAGE NOTES
Pt to the ED from home for elevated BP/HA/and CP. Pt has been off her BP meds for a few months and hasn't been to her PCP for refill

## 2024-10-07 NOTE — ED PROVIDER NOTES
Chief Complaint   Patient presents with    Hypertension     HPI:   Zayra Sandoval is an 34 y.o. female with history of currently uncontrolled HTN, endometriosis, GAIL, HLD that presents to the ED for evaluation of headache and high blood pressure.  States her blood pressure was greater than 200 systolic yesterday evening.  Says she supposed be on hydrochlorothiazide but has been out of it for about a month and missed her doctor's appointment.  Endorses daily headaches for the past month.  She denies any vision changes, double vision, blurry vision, had floaters a few days ago but no floaters now.  Denies curtain sensation, loss of vision, dizziness or lightheadedness, syncope.  Endorses occasional chest pain that she describes as sharp and shoots up her neck.  She said this happens every time her blood pressure is high.  She is not experiencing any chest pain today and has not experienced chest pain within the past 24 hours.  She endorses nausea, 9/10 generalized headache that she describes as throbbing.  Not take any medication for pain today.  Did take friends amlodipine last night because her systolic blood pressure was still high.  She also endorses bilateral foot and ankle swelling.  She is on oral contraceptives.  Denies personal history of bleeding clotting disorder but does note that her grandfather had history of blood clots.  She says she when she is on her medication her blood pressure is well-controlled in the 120s over 70s.    Medications:  Soc HX: Denies substance use  Allergies   Allergen Reactions    Milk Diarrhea   :  Past Medical History:   Diagnosis Date    Acne vulgaris 02/26/2015    Benign neoplasm of pituitary gland (Multi) 04/21/2015    Pituitary adenoma    Benign neoplasm of pituitary gland (Multi) 04/21/2015    Pituitary microadenoma with hyperprolactinemia    Cough 08/28/2023    Intractable hemiplegic migraine 08/10/2012    Other specified health status     No pertinent past surgical  history    Pneumothorax, unspecified 01/05/2021    Pneumothorax    Urinary frequency 08/28/2023    Weight gain 08/28/2023     Past Surgical History:   Procedure Laterality Date    CT GUIDED PERCUTANEOUS BIOPSY MEDIASTINUM  6/20/2014    CT GUIDED PERCUTANEOUS BIOPSY MEDIASTINUM 6/20/2014 [facility]    LUNG LOBECTOMY  02/04/2015    Lung Lobectomy    OTHER SURGICAL HISTORY  05/10/2014    Dental Surgery    OTHER SURGICAL HISTORY  02/04/2015    Thoracoscopy (Therapeutic) With Lobectomy Pneumonectomy     No family history on file.     Physical Exam  Vitals and nursing note reviewed.   Constitutional:       General: She is not in acute distress.     Appearance: She is not ill-appearing or toxic-appearing.      Comments: Elevated BMI   HENT:      Head: Normocephalic and atraumatic.      Right Ear: Tympanic membrane, ear canal and external ear normal.      Left Ear: Tympanic membrane, ear canal and external ear normal.      Nose: Nose normal.      Mouth/Throat:      Mouth: Mucous membranes are moist.   Eyes:      Extraocular Movements: Extraocular movements intact.      Pupils: Pupils are equal, round, and reactive to light.      Comments: Reports some dizziness with eye movement.  Horizontal nystagmus.   Cardiovascular:      Rate and Rhythm: Normal rate and regular rhythm.      Pulses: Normal pulses.      Heart sounds: Normal heart sounds.      Comments: Negative Jose D bilaterally.  Pulmonary:      Effort: Pulmonary effort is normal. No respiratory distress.      Breath sounds: Normal breath sounds.   Abdominal:      General: Bowel sounds are normal.      Palpations: Abdomen is soft.      Tenderness: There is no abdominal tenderness. There is no guarding or rebound.   Musculoskeletal:         General: Normal range of motion.      Cervical back: Normal range of motion. No tenderness.      Comments: 5/5 strength bilateral upper and lower extremities.   Skin:     General: Skin is warm and dry.   Neurological:       General: No focal deficit present.      Mental Status: She is alert and oriented to person, place, and time.      Cranial Nerves: No cranial nerve deficit.      Sensory: No sensory deficit.      Motor: No weakness.      Coordination: Coordination normal.     VS: As documented in the triage note and EMR flowsheet from this visit were reviewed.    EKG INTERPRETATION:      Personally Reviewed      Rhythm:  Normal sinus rhythm      Rate: 86 bpm      Axis: Normal axis      Intervals:  Normal NJ interval 154 ms     QRS Complex:  Normal      ST Segment: T wave inversion lead III     QT Interval: QTc 423 ms      Compared with Prior: Similar to prior       Medical Decision Making:   ED Course as of 10/07/24 1351   Mon Oct 07, 2024   0952 Patient is 34-year-old female presents to the ED for evaluation of headache chest pain and poorly controlled blood pressure.  Admits that she has not been taking her medication as prescribed.  Will make sure she goes home with medication if she is discharged today.  On my exam she appears well.  Limited funduscopic exam is normal.  She has no focal deficits.  Symmetric strength and sensation bilateral upper and lower extremities.  EOMI testing did elicit some dizziness however she has no ataxia.  DDx includes, but is not limited to, hypertensive urgency/emergency, ACS, ICH, angina.  Will obtain labs to include troponins, CT of the head.  Patient to be given morphine, Zofran and hydralazine. [KA]   1135 I personally reviewed labs.  CBC without abnormalities.  CMP shows normal renal function, normal electrolytes and normal liver function.  Troponin normal.  Urine without signs of infection. [KA]   1135 IMPRESSION:  No acute intracranial process.      Empty sella.      7 mm cystic lesion along left ambient cistern decreased in size since  prior study.      Mild right sphenoid sinusitis.   [KA]   1206 Discussed labs and imaging with patient.   On reassessment, patient says pain minimally  improved. BP improving to 130s/100s. Will obtain delta trop and reassess. [KA]   1310 Patient complaining of headache again.  Have ordered 15 mg IV Toradol. [KA]   1341 Trope x 2 normal. [KA]   1345 Discussed results with patient.  She says she is feeling better.  She is going to call her primary care doctor to schedule follow-up.  First-line antihypertensive for African-Americans is amlodipine.  Will send home with prescription for amlodipine.  Recommended that if this does not work she can add hydrochlorothiazide.  Advised that she should return to ED for any new or worsening symptoms.  She is agreeable. [KA]      ED Course User Index  [KA] Kailee Rayo PA-C         Diagnoses as of 10/07/24 1351   Hypertensive urgency   Acute nonintractable headache, unspecified headache type      Chronic Medical Conditions Significantly Affecting Care:      Escalation of Care: Appropriate for outpatient mgmt       Counseling: Spoke with the patient and discussed today´s findings, in addition to providing specific details for the plan of care and expected course.  Patient was given the opportunity to ask questions.    Discussed return precautions and importance of follow-up.  Advised to follow-up with PCP.  Advised to return to the ED for changing or worsening symptoms, new symptoms, complaint specific precautions, and precautions listed on the discharge paperwork.  Educated on the common potential side effects of medications prescribed.    I advised the patient that the emergency evaluation and treatment provided today doesn't end their need for medical care. It is very important that they follow-up with their primary care provider or other specialist as instructed.    The plan of care was mutually agreed upon with the patient. The patient and/or family were given the opportunity to ask questions. All questions asked today in the ED were answered to the best of my ability with today's information.    I specifically advised  the patient to return to the ED for changing or worsening symptoms, worrisome new symptoms, or for any complaint specific precautions listed on the discharge paperwork.    This patient was cared for in the setting of nationwide stress on resources and staffing.    This report was transcribed using voice recognition software.  Every effort was made to ensure accuracy, however, inadvertently computerized transcription errors may be present.       Kailee Rayo PA-C  10/07/24 [id number]

## 2024-10-21 ENCOUNTER — APPOINTMENT (OUTPATIENT)
Dept: PRIMARY CARE | Facility: CLINIC | Age: 34
End: 2024-10-21
Payer: COMMERCIAL

## 2024-10-21 VITALS — WEIGHT: 184 LBS | BODY MASS INDEX: 35.94 KG/M2 | DIASTOLIC BLOOD PRESSURE: 90 MMHG | SYSTOLIC BLOOD PRESSURE: 138 MMHG

## 2024-10-21 DIAGNOSIS — F41.9 ANXIETY: Primary | ICD-10-CM

## 2024-10-21 DIAGNOSIS — I10 PRIMARY HYPERTENSION: ICD-10-CM

## 2024-10-21 DIAGNOSIS — I16.0 HYPERTENSIVE URGENCY: ICD-10-CM

## 2024-10-21 PROCEDURE — 3080F DIAST BP >= 90 MM HG: CPT | Performed by: STUDENT IN AN ORGANIZED HEALTH CARE EDUCATION/TRAINING PROGRAM

## 2024-10-21 PROCEDURE — 99214 OFFICE O/P EST MOD 30 MIN: CPT | Performed by: STUDENT IN AN ORGANIZED HEALTH CARE EDUCATION/TRAINING PROGRAM

## 2024-10-21 PROCEDURE — 1036F TOBACCO NON-USER: CPT | Performed by: STUDENT IN AN ORGANIZED HEALTH CARE EDUCATION/TRAINING PROGRAM

## 2024-10-21 PROCEDURE — 3075F SYST BP GE 130 - 139MM HG: CPT | Performed by: STUDENT IN AN ORGANIZED HEALTH CARE EDUCATION/TRAINING PROGRAM

## 2024-10-21 RX ORDER — BUSPIRONE HYDROCHLORIDE 5 MG/1
5 TABLET ORAL 2 TIMES DAILY
Qty: 60 TABLET | Refills: 11 | Status: SHIPPED | OUTPATIENT
Start: 2024-10-21 | End: 2025-10-21

## 2024-10-21 RX ORDER — AMLODIPINE BESYLATE 10 MG/1
10 TABLET ORAL DAILY
Qty: 90 TABLET | Refills: 1 | Status: SHIPPED | OUTPATIENT
Start: 2024-10-21 | End: 2025-04-19

## 2024-10-21 RX ORDER — HYDROCHLOROTHIAZIDE 25 MG/1
12.5 TABLET ORAL DAILY
Qty: 45 TABLET | Refills: 1 | Status: SHIPPED | OUTPATIENT
Start: 2024-10-21 | End: 2025-04-19

## 2024-10-21 RX ORDER — HYDROXYZINE HYDROCHLORIDE 25 MG/1
25 TABLET, FILM COATED ORAL DAILY PRN
Qty: 30 TABLET | Refills: 5 | Status: SHIPPED | OUTPATIENT
Start: 2024-10-21 | End: 2025-04-19

## 2024-10-21 ASSESSMENT — PATIENT HEALTH QUESTIONNAIRE - PHQ9
SUM OF ALL RESPONSES TO PHQ9 QUESTIONS 1 AND 2: 0
2. FEELING DOWN, DEPRESSED OR HOPELESS: NOT AT ALL
1. LITTLE INTEREST OR PLEASURE IN DOING THINGS: NOT AT ALL

## 2024-10-21 NOTE — PROGRESS NOTES
Subjective   Patient ID: Zayra Sandoval is a 34 y.o. female who presents for follow up from ED (High blood pressure) and Med Refill.  HPI  Zayra was seen in the ED for high blood pressure recently. Was started on amlodipine at that time, which she has been taking as prescribed. She had previously been on hydrochlorothiazide, but ran out of the medication and stopped taking. 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     Review of Systems  12-point ROS was reviewed and is negative, unless otherwise noted in HPI    Objective   Vitals:    10/21/24 1348   BP: 138/90      Physical Exam  GEN: alert, conversant, NAD  HEENT: PERRL, EOMI, MMM  NECK: supple, no LAD appreciated  CHEST: CTAB  CV: S1, S2, RRR, no murmurs appreciated  ABD: soft, NT, ND  EXT: no significant LE edema  SKIN: warm, dry    Assessment/Plan   # HTN  Suboptimal control  Continue amlodipine 10mg daily, resume hydrochlorothiazide 12.5mg daily (maintained on OCPs for birth control and takes reliably)  Discussed DASH diet and dietary sodium restrictions.   Increase dietary efforts and physical activity.     #anxiety  - start buspar 5mg BID, SE profile discussed  - given hydroxyzine for breakthrough panic    RTC in 2-3 months for yearly physical and BP recheck.     Cyrus Serrano DO

## 2024-10-24 ENCOUNTER — TELEPHONE (OUTPATIENT)
Dept: PRIMARY CARE | Facility: CLINIC | Age: 34
End: 2024-10-24
Payer: COMMERCIAL

## 2024-10-24 ENCOUNTER — DOCUMENTATION (OUTPATIENT)
Dept: PRIMARY CARE | Facility: CLINIC | Age: 34
End: 2024-10-24
Payer: COMMERCIAL

## 2024-10-24 NOTE — PROGRESS NOTES
Zayra  I am sorry to hear you are struggling with same anxiety at this point, I see you that you saw one of my partner's Dr Campbell on 10/21/24 and you were started on treatment for this.  I hope that helps I would follow-up in the office with me in the next 1 to 2 months to reevaluate and see how you are doing.  I have sent you a letter as per your request as well regarding the anxiety.    Patria Harper routed conversation to You5 hours ago (12:11 PM)     Patria Harper5 hours ago (12:11 PM)       Pt would like a letter for school stating diagnosed and with struggling with anxiety         Note        Zayra Sandoval [phone]  Patria Harper

## 2024-12-27 ENCOUNTER — APPOINTMENT (OUTPATIENT)
Dept: PRIMARY CARE | Facility: CLINIC | Age: 34
End: 2024-12-27
Payer: COMMERCIAL

## 2024-12-27 VITALS — DIASTOLIC BLOOD PRESSURE: 84 MMHG | SYSTOLIC BLOOD PRESSURE: 104 MMHG

## 2024-12-27 DIAGNOSIS — I16.0 HYPERTENSIVE URGENCY: ICD-10-CM

## 2024-12-27 DIAGNOSIS — E55.9 VITAMIN D DEFICIENCY: Primary | ICD-10-CM

## 2024-12-27 DIAGNOSIS — E66.812 OBESITY, CLASS II, BMI 35-39.9: ICD-10-CM

## 2024-12-27 DIAGNOSIS — I10 PRIMARY HYPERTENSION: ICD-10-CM

## 2024-12-27 DIAGNOSIS — F41.9 ANXIETY: ICD-10-CM

## 2024-12-27 PROCEDURE — 3079F DIAST BP 80-89 MM HG: CPT | Performed by: STUDENT IN AN ORGANIZED HEALTH CARE EDUCATION/TRAINING PROGRAM

## 2024-12-27 PROCEDURE — 1036F TOBACCO NON-USER: CPT | Performed by: STUDENT IN AN ORGANIZED HEALTH CARE EDUCATION/TRAINING PROGRAM

## 2024-12-27 PROCEDURE — 3074F SYST BP LT 130 MM HG: CPT | Performed by: STUDENT IN AN ORGANIZED HEALTH CARE EDUCATION/TRAINING PROGRAM

## 2024-12-27 PROCEDURE — 99395 PREV VISIT EST AGE 18-39: CPT | Performed by: STUDENT IN AN ORGANIZED HEALTH CARE EDUCATION/TRAINING PROGRAM

## 2024-12-27 RX ORDER — HYDROCHLOROTHIAZIDE 25 MG/1
12.5 TABLET ORAL DAILY
Qty: 45 TABLET | Refills: 3 | Status: SHIPPED | OUTPATIENT
Start: 2024-12-27 | End: 2025-12-27

## 2024-12-27 RX ORDER — AMLODIPINE BESYLATE 10 MG/1
10 TABLET ORAL DAILY
Qty: 90 TABLET | Refills: 3 | Status: SHIPPED | OUTPATIENT
Start: 2024-12-27 | End: 2025-12-27

## 2024-12-27 RX ORDER — BUSPIRONE HYDROCHLORIDE 5 MG/1
5 TABLET ORAL 2 TIMES DAILY
Qty: 180 TABLET | Refills: 3 | Status: SHIPPED | OUTPATIENT
Start: 2024-12-27 | End: 2025-12-27

## 2024-12-27 ASSESSMENT — PATIENT HEALTH QUESTIONNAIRE - PHQ9
SUM OF ALL RESPONSES TO PHQ9 QUESTIONS 1 AND 2: 0
1. LITTLE INTEREST OR PLEASURE IN DOING THINGS: NOT AT ALL
2. FEELING DOWN, DEPRESSED OR HOPELESS: NOT AT ALL

## 2024-12-27 NOTE — PROGRESS NOTES
Subjective   Patient ID: Zayra Sandoval is a 34 y.o. female who presents for Follow-up.  HPI  Zayra is here for follow up and yearly physical.    She is doing well overall. Anxiety is much improved with buspar BID. She is taking amlodipine and hydrochlorothiazide as prescribed. No new complaints or concerns. Wants to be more intentional with lifestyle modifications and weight loss efforts.    Review of Systems  12-point ROS was reviewed and is negative, unless otherwise noted in HPI    Objective   Vitals:    12/27/24 1512   BP: 104/84      Physical Exam  GEN: alert, conversant, NAD  HEENT: PERRL, EOMI, MMM  NECK: supple, no LAD appreciated  CHEST: CTAB  CV: S1, S2, RRR, no murmurs appreciated  ABD: soft, NT, ND  EXT: no significant LE edema  SKIN: warm, dry    Assessment/Plan   #well adult  - Counseled continued efforts on healthy lifestyle modification including balanced diet, and continued exercise for >5 minutes  - counseled age appropriate vaccines and preventative measures    # HTN  Well controlled  Continue amlodipine 10mg daily, hydrochlorothiazide 6.25mg daily (maintained on OCPs for birth control and takes reliably)  Discussed DASH diet and dietary sodium restrictions.   Increase dietary efforts and physical activity.     #anxiety  Well controlled  - continue buspar 5mg BID  - given hydroxyzine for breakthrough panic    #Obesity, Class II by BMI  - Counseled continued efforts on lifestyle modification including weight loss, diet, and increased exercise for >5 minutes    RTC in 6 months, sooner PRN     Cyrus Serrano,

## 2025-06-23 ENCOUNTER — APPOINTMENT (OUTPATIENT)
Facility: CLINIC | Age: 35
End: 2025-06-23
Payer: COMMERCIAL

## 2025-06-23 ENCOUNTER — PATIENT MESSAGE (OUTPATIENT)
Dept: OBSTETRICS AND GYNECOLOGY | Facility: CLINIC | Age: 35
End: 2025-06-23

## 2025-06-23 DIAGNOSIS — N80.9 ENDOMETRIOSIS: ICD-10-CM

## 2025-06-24 RX ORDER — NORETHINDRONE 5 MG/1
5 TABLET ORAL DAILY
Qty: 90 TABLET | Refills: 0 | Status: SHIPPED | OUTPATIENT
Start: 2025-06-24

## 2025-07-14 ENCOUNTER — APPOINTMENT (OUTPATIENT)
Facility: CLINIC | Age: 35
End: 2025-07-14
Payer: COMMERCIAL

## 2025-07-14 VITALS
HEIGHT: 61 IN | BODY MASS INDEX: 32.47 KG/M2 | WEIGHT: 172 LBS | DIASTOLIC BLOOD PRESSURE: 95 MMHG | SYSTOLIC BLOOD PRESSURE: 135 MMHG

## 2025-07-14 DIAGNOSIS — N63.21 MASS OF UPPER OUTER QUADRANT OF LEFT BREAST: Primary | ICD-10-CM

## 2025-07-14 PROCEDURE — 1036F TOBACCO NON-USER: CPT | Performed by: STUDENT IN AN ORGANIZED HEALTH CARE EDUCATION/TRAINING PROGRAM

## 2025-07-14 PROCEDURE — 99395 PREV VISIT EST AGE 18-39: CPT | Performed by: STUDENT IN AN ORGANIZED HEALTH CARE EDUCATION/TRAINING PROGRAM

## 2025-07-14 PROCEDURE — 3080F DIAST BP >= 90 MM HG: CPT | Performed by: STUDENT IN AN ORGANIZED HEALTH CARE EDUCATION/TRAINING PROGRAM

## 2025-07-14 PROCEDURE — 3008F BODY MASS INDEX DOCD: CPT | Performed by: STUDENT IN AN ORGANIZED HEALTH CARE EDUCATION/TRAINING PROGRAM

## 2025-07-14 PROCEDURE — 3075F SYST BP GE 130 - 139MM HG: CPT | Performed by: STUDENT IN AN ORGANIZED HEALTH CARE EDUCATION/TRAINING PROGRAM

## 2025-07-14 ASSESSMENT — ENCOUNTER SYMPTOMS
DEPRESSION: 0
OCCASIONAL FEELINGS OF UNSTEADINESS: 0
LOSS OF SENSATION IN FEET: 0

## 2025-07-14 ASSESSMENT — COLUMBIA-SUICIDE SEVERITY RATING SCALE - C-SSRS
2. HAVE YOU ACTUALLY HAD ANY THOUGHTS OF KILLING YOURSELF?: NO
1. IN THE PAST MONTH, HAVE YOU WISHED YOU WERE DEAD OR WISHED YOU COULD GO TO SLEEP AND NOT WAKE UP?: NO
6. HAVE YOU EVER DONE ANYTHING, STARTED TO DO ANYTHING, OR PREPARED TO DO ANYTHING TO END YOUR LIFE?: NO

## 2025-07-14 ASSESSMENT — PATIENT HEALTH QUESTIONNAIRE - PHQ9
2. FEELING DOWN, DEPRESSED OR HOPELESS: NOT AT ALL
1. LITTLE INTEREST OR PLEASURE IN DOING THINGS: NOT AT ALL
SUM OF ALL RESPONSES TO PHQ9 QUESTIONS 1 AND 2: 0

## 2025-07-14 NOTE — PROGRESS NOTES
Subjective   Zayra Sandoval is a 35 y.o.  who presents today for an annual exam.     -Diet:  working on weight loss currently  -Exercise: yes     GynHx:  -Menses: takes norethindrone 5mg daily  -LMP: suppressed  -Sexual Activity: yes with   -Sexual Concerns: no   -Contraception: none  -Barrier Protection: no   -Prior STIs:  no   -STI Screening: no  -Last pap:   NILM HPV neg  2020 NILM  2019 NILM HPV neg    Remote h/o LEEP     OBHx: P3,  x3  PMHx: HTN, GAIL, PTX, endometriosis  of lungs, prolactinoma  SurgHx: lung surgery, LEEP   Social:   Rare etoh, no tobacco use or drug use  Works as nurse currently--travel nursing  Lives with  and children (18, 16, and 4)  FHx:  Mother- breast cancer, s/p genetic testing. Pt has had testing BRCA.     Objective   Physical Exam  Vitals:    25 1121   BP: (!) 135/95      Gen: awake, alert  Head: NCAT  HEENT: moist mucus membranes  Breast: noteable for 1 cm mobile mass in upper outer quadrant of L breast  Pulm: breathing comfortably on room air  CV: warm and well-perfused  Abd: soft non tener non distended  :   Normal vulva  Normal vaginal mucosa  Normal cervix  Neuro: alert and oriented  Psych: appropriate affect     Assessment/Plan     Zayra Sandoval is a 35 y.o.  who presents today for an annual exam.     Health Maintenance  -Pap: dude   -Mammogram: dx mammogram ordered  -STI: declines  -Contraception: none; discussed norethindrone may or may not suppress ovulation and is not regarded as a contraceptive alone. Pt aware.  -Encouraged healthy diet and exercise    Follow up in 1 year for annual exam     Rachel Rachel MD

## 2025-08-27 ENCOUNTER — APPOINTMENT (OUTPATIENT)
Dept: ENDOCRINOLOGY | Facility: CLINIC | Age: 35
End: 2025-08-27
Payer: COMMERCIAL

## 2025-09-02 ENCOUNTER — APPOINTMENT (OUTPATIENT)
Dept: ENDOCRINOLOGY | Facility: CLINIC | Age: 35
End: 2025-09-02
Payer: COMMERCIAL

## 2025-09-02 ASSESSMENT — ENCOUNTER SYMPTOMS
DEPRESSION: 0
NUMBNESS: 0
SHORTNESS OF BREATH: 0
POLYPHAGIA: 0
FREQUENCY: 0
NERVOUS/ANXIOUS: 0
NAUSEA: 0
FATIGUE: 0
POLYDIPSIA: 0
LOSS OF SENSATION IN FEET: 0
OCCASIONAL FEELINGS OF UNSTEADINESS: 0
WHEEZING: 0
DYSPHORIC MOOD: 0
PALPITATIONS: 0
CONSTIPATION: 0
ARTHRALGIAS: 0

## 2025-09-02 ASSESSMENT — PAIN SCALES - GENERAL: PAINLEVEL_OUTOF10: 0-NO PAIN

## 2025-09-04 PROBLEM — E66.811 OBESITY, CLASS 1: Status: ACTIVE | Noted: 2025-09-04

## 2025-09-04 PROBLEM — R73.03 PREDIABETES: Status: ACTIVE | Noted: 2025-09-04
